# Patient Record
Sex: FEMALE | Race: WHITE | NOT HISPANIC OR LATINO | Employment: OTHER | URBAN - METROPOLITAN AREA
[De-identification: names, ages, dates, MRNs, and addresses within clinical notes are randomized per-mention and may not be internally consistent; named-entity substitution may affect disease eponyms.]

---

## 2023-04-27 ENCOUNTER — TELEPHONE (OUTPATIENT)
Dept: PRIMARY CARE | Facility: CLINIC | Age: 78
End: 2023-04-27

## 2024-01-11 ENCOUNTER — TELEPHONE (OUTPATIENT)
Dept: PULMONOLOGY | Facility: CLINIC | Age: 79
End: 2024-01-11

## 2024-01-11 NOTE — TELEPHONE ENCOUNTER
Gaurang bell she is an old patient of your and is seeking the names of good cardiologist and also would like your advice for joint pain.

## 2024-03-12 DIAGNOSIS — N95.2 ATROPHIC VAGINITIS: Primary | ICD-10-CM

## 2024-03-12 RX ORDER — ESTRADIOL 0.1 MG/G
CREAM VAGINAL
Qty: 127.5 G | Refills: 3 | Status: SHIPPED | OUTPATIENT
Start: 2024-03-12 | End: 2025-03-12

## 2024-10-03 DIAGNOSIS — M19.019 GLENOHUMERAL ARTHRITIS: ICD-10-CM

## 2024-10-04 ENCOUNTER — HOSPITAL ENCOUNTER (OUTPATIENT)
Dept: RADIOLOGY | Facility: CLINIC | Age: 79
Discharge: HOME | End: 2024-10-04
Payer: MEDICARE

## 2024-10-04 ENCOUNTER — APPOINTMENT (OUTPATIENT)
Dept: ORTHOPEDIC SURGERY | Facility: CLINIC | Age: 79
End: 2024-10-04
Payer: MEDICARE

## 2024-10-04 VITALS — HEIGHT: 58 IN | BODY MASS INDEX: 22.04 KG/M2 | WEIGHT: 105 LBS

## 2024-10-04 DIAGNOSIS — M19.019 GLENOHUMERAL ARTHRITIS: ICD-10-CM

## 2024-10-04 DIAGNOSIS — M75.112 INCOMPLETE TEAR OF LEFT ROTATOR CUFF, UNSPECIFIED WHETHER TRAUMATIC: Primary | ICD-10-CM

## 2024-10-04 PROCEDURE — 73030 X-RAY EXAM OF SHOULDER: CPT | Mod: LT

## 2024-10-04 PROCEDURE — 99203 OFFICE O/P NEW LOW 30 MIN: CPT | Performed by: STUDENT IN AN ORGANIZED HEALTH CARE EDUCATION/TRAINING PROGRAM

## 2024-10-04 NOTE — PROGRESS NOTES
CHIEF COMPLAINT:   Chief Complaint   Patient presents with    Left Shoulder - Pain     History: 79 y.o. female presents to the office today for evaluation of her left shoulder.  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 She states that the left shoulder is been a problem for a few years.  She does have a previous MRI from 2021.  She says that overall it was doing okay, but a few weeks ago she started having severe pain in the left shoulder.  This is actually resolved.  She is here today to discuss her shoulder.    Past medical history, past surgical history, medications, allergies, family history, social history, and review of systems were reviewed today.    A 12 point review of systems was negative other than as stated in the HPI.    Past Medical History:   Diagnosis Date    Benign paroxysmal vertigo, unspecified ear     BPPV (benign paroxysmal positional vertigo)    Ganglion, left shoulder 09/15/2021    Ganglion of left shoulder    Nondisplaced unspecified fracture of right lesser toe(s), initial encounter for closed fracture 06/16/2021    Closed nondisplaced fracture of phalanx of lesser toe of right foot, unspecified phalanx, initial encounter    Personal history of other diseases of the circulatory system     History of hypotension        Not on File     Past Surgical History:   Procedure Laterality Date    BREAST BIOPSY  04/06/2017    Biopsy Breast Percutaneous Needle Core    TONSILLECTOMY  03/04/2014    Tonsillectomy        No family history on file.     Social History     Socioeconomic History    Marital status:      Spouse name: Not on file    Number of children: Not on file    Years of education: Not on file    Highest education level: Not on file   Occupational History    Not on file   Tobacco Use    Smoking status: Not on file    Smokeless tobacco: Not on file   Substance and Sexual Activity    Alcohol use: Not on file    Drug use: Not on file    Sexual activity: Not on  "file   Other Topics Concern    Not on file   Social History Narrative    Not on file     Social Determinants of Health     Financial Resource Strain: Not on file   Food Insecurity: Not on file   Transportation Needs: Not on file   Physical Activity: Insufficiently Active (4/6/2020)    Received from [facility]    Exercise Vital Sign     Days of Exercise per Week: 5 days     Minutes of Exercise per Session: 10 min   Stress: No Stress Concern Present (4/6/2020)    Received from [facility]    [place] [employer] - Occupational Stress Questionnaire     Feeling of Stress : Not at all   Social Connections: Not on file   Intimate Partner Violence: Not on file   Housing Stability: Not on file        CURRENT MEDICATIONS:   Current Outpatient Medications   Medication Sig Dispense Refill    estradiol (Estrace) 0.01 % (0.1 mg/gram) vaginal cream Apply nightly for 3 weeks, then 3 times per week. 127.5 g 3     No current facility-administered medications for this visit.       Physical Examination:      7/12/2022     8:10 AM 7/14/2022     1:01 PM 7/20/2022    11:47 AM 8/18/2022     1:53 PM 9/22/2022     3:04 PM 9/26/2022     7:22 AM 5/11/2023     2:36 PM   Vitals   Systolic  111 113 113 105  124   Diastolic  70 58 69 54  78   Heart Rate  62 76 85 79  80   Temp  36.7 °C (98 °F) 40.6 °C (105 °F)  36.7 °C (98 °F)  36.7 °C (98.1 °F)   Resp  18  18 16     Height (in) 1.47 m (4' 9.87\")  1.473 m (4' 10\") 1.473 m (4' 10\") 1.473 m (4' 10\") 1.471 m (4' 9.91\") 1.473 m (4' 10\")   Weight (lb) 105.16 108 105 106.13 106 108.03 105   BMI 22.07 kg/m2 22.67 kg/m2 21.95 kg/m2 22.18 kg/m2 22.15 kg/m2 22.65 kg/m2 21.95 kg/m2   BSA (m2) 1.4 m2 1.41 m2 1.4 m2 1.4 m2 1.4 m2 1.41 m2 1.4 m2      There is no height or weight on file to calculate BMI.    Well-appearing, appears stated age, pleasant and cooperative, appropriate mood and behavior. Height and weight reviewed. Alert and oriented " x3.  Auditory function intact.  No acute distress.  Intact ocular function, MEKA, EOMI. Breathing is unlabored .  There is no evidence of jugular venous distension. Skin appearance is normal without evidence of rash or other lesions. 2+ radial pulses bilaterally, fingers pink and wwp, good capillary refill, no pitting edema. No appreciable lymphadenopathy in bilateral upper extremities. SILT throughout both upper extremities, median/radial/ulnar/musculocutaneous/axillary nerve motor and sensory intact (except for abnormalities noted in focused musculoskeletal exam section below).     Neck exam: Full range of motion of the neck in flexion/extension and rotational movements. No significant areas of tenderness to palpation in the neck.    On exam of bilateral upper extremities, she does have good range of motion of the shoulders today.  Active forward flexion to 140, external Tatian of 40, internal Tatian T12.  Full strength rotator cuff strength testing bilaterally.  Minimal pain with Neer and Olivas maneuvers of the left shoulder.    Imaging: Radiographs of the left shoulder were performed today.  Person interpreted by myself.  Preserved glenohumeral joint space.  AC joint arthritis is noted.  I did also review an MRI from 2021 which does show partial-thickness tear of the supraspinatus and infraspinatus.    Assessment: Partial-thickness rotator cuff tears    Plan: Patient's symptoms, test result and exam are consistent with a diagnosis of rotator cuff tendinitis.  We did discuss the natural history of this.  Conservative management is often the first-line treatment for this, which includes physical therapy, injections and activity modification.  In rare cases of recalcitrant pain, surgery may be indicated, but this is only after extensive nonoperative care.  Patient understands this.  They wish to proceed with home directed therapy program.      Dragon software was used to dictate this note, please be aware that  minor errors in transcription may be present.    Maury Moran MD    Shoulder/Elbow Surgery  [facility]

## 2024-10-21 ENCOUNTER — APPOINTMENT (OUTPATIENT)
Dept: RADIOLOGY | Facility: HOSPITAL | Age: 79
End: 2024-10-21
Payer: MEDICARE